# Patient Record
Sex: FEMALE | Race: ASIAN | NOT HISPANIC OR LATINO | URBAN - METROPOLITAN AREA
[De-identification: names, ages, dates, MRNs, and addresses within clinical notes are randomized per-mention and may not be internally consistent; named-entity substitution may affect disease eponyms.]

---

## 2019-02-27 ENCOUNTER — EMERGENCY (EMERGENCY)
Facility: HOSPITAL | Age: 39
LOS: 1 days | Discharge: HOME | End: 2019-02-27
Admitting: EMERGENCY MEDICINE
Payer: MEDICAID

## 2019-02-27 VITALS
TEMPERATURE: 98 F | SYSTOLIC BLOOD PRESSURE: 124 MMHG | OXYGEN SATURATION: 100 % | DIASTOLIC BLOOD PRESSURE: 88 MMHG | RESPIRATION RATE: 18 BRPM | HEART RATE: 110 BPM

## 2019-02-27 VITALS
HEART RATE: 90 BPM | RESPIRATION RATE: 16 BRPM | DIASTOLIC BLOOD PRESSURE: 78 MMHG | SYSTOLIC BLOOD PRESSURE: 130 MMHG | TEMPERATURE: 98 F | OXYGEN SATURATION: 100 %

## 2019-02-27 DIAGNOSIS — Z98.89 OTHER SPECIFIED POSTPROCEDURAL STATES: Chronic | ICD-10-CM

## 2019-02-27 DIAGNOSIS — F43.20 ADJUSTMENT DISORDER, UNSPECIFIED: ICD-10-CM

## 2019-02-27 PROCEDURE — 90792 PSYCH DIAG EVAL W/MED SRVCS: CPT | Mod: GC

## 2019-02-27 PROCEDURE — 99283 EMERGENCY DEPT VISIT LOW MDM: CPT

## 2019-02-27 NOTE — ED ADULT NURSE NOTE - CHIEF COMPLAINT QUOTE
Pt sent over from [facility] for depression and anxiety  Pt reports having suicidal thoughts x 2 weeks ago but denies any now. Pt states stopped taking her medication x 1 month ago.

## 2019-02-27 NOTE — ED ADULT TRIAGE NOTE - CHIEF COMPLAINT QUOTE
Pt sent over from [facility] for depression and anxiety  Pt reports having suicidal thoughts x 2 weeks ago but denies any now. Pt states stopped taking her medication x 1 month ago.

## 2019-02-27 NOTE — ED BEHAVIORAL HEALTH ASSESSMENT NOTE - SAFETY PLAN DETAILS
Patient instructed to call 911 or go to nearest ED with any thoughts of harming self or others. Patient identified distraction and seeking support from family as coping skills.

## 2019-02-27 NOTE — ED ADULT NURSE NOTE - OBJECTIVE STATEMENT
Received pt in  pt bought in from [facility] for psych eval,  pt verbalizing depression denies Si/Hi/AVh  presently safety & comfort measures maintained eval on going.

## 2019-02-27 NOTE — ED ADULT NURSE REASSESSMENT NOTE - NS ED NURSE REASSESS COMMENT FT1
Pt calm and cooperative at this time. Discharge paperwork provided by NP. Belongings returned. no acute distress.
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
## 2019-02-27 NOTE — ED BEHAVIORAL HEALTH ASSESSMENT NOTE - CASE SUMMARY
IN BRIEF, this is a 37 yo F sent in from her school after breaking down about feeling empty.  No clear SI or HI at present.  No psychotic symptoms. Patient is calm and cooperative.  No acute safety concerns. Patient does not want psychiatric admission.  As such, no legal grounds for involuntary admission.  Cleared psychiatrically for discharge.  Patient can follow-up at crisis center tomorrow to establish outpatient care.

## 2019-02-27 NOTE — ED PROVIDER NOTE - OBJECTIVE STATEMENT
37 y/o female presents to [facility] EMS from [facility] for psych eval for SI.  As per EMS, pt spoke to a counselor at school and expressed that she was suicidal, but unsure of a plan.  PT denies this, she states she told them she was suicidal 2 weeks ago, "but I don't feel that way now".  Pt is alert and oriented x 3, denies SI/HI/AH/VH.  Pt is calm and cooperative in  area and c/o a headache.
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
## 2019-02-27 NOTE — ED BEHAVIORAL HEALTH ASSESSMENT NOTE - OTHER PAST PSYCHIATRIC HISTORY (INCLUDE DETAILS REGARDING ONSET, COURSE OF ILLNESS, INPATIENT/OUTPATIENT TREATMENT)
PPHx of depression. Previously in therapy at [facility] (self d/c'ed several months ago). Was on Prozac 40mg qd prescribed by PMD, but self discontinued 1 month ago).

## 2019-02-27 NOTE — ED PROVIDER NOTE - CLINICAL SUMMARY MEDICAL DECISION MAKING FREE TEXT BOX
39 y/o female presents to [facility] EMS from [facility] for psych eval for SI. 39 y/o female presents to [facility] from [facility] for psych eval for SI.  Physical examination performed.  No clinical evidence of intoxication or any acute medical issues/problems requiring immediate interventions.  Psychiatric consultation requested and patient cleared for safe discharge.

## 2019-02-27 NOTE — ED BEHAVIORAL HEALTH ASSESSMENT NOTE - DESCRIPTION
Patient was calm and cooperative in the ED and did not exhibit any aggression. Pt did not require any prn medications or any physical restraints. 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

## 2019-02-27 NOTE — ED BEHAVIORAL HEALTH ASSESSMENT NOTE - SUICIDE PROTECTIVE FACTORS
Responsibility to family and others/Supportive social network or family/Future oriented/Engaged in work or school/Identifies reasons for living/Ability to cope with stress

## 2019-02-27 NOTE — ED BEHAVIORAL HEALTH ASSESSMENT NOTE - REFERRAL / APPOINTMENT DETAILS
normal...
Patient provided with information fro LT[facility], located within [facility]

## 2019-02-27 NOTE — ED BEHAVIORAL HEALTH NOTE - BEHAVIORAL HEALTH NOTE
Received call from Kylee Vaca ([phone]), director of counseling center at [facility], regarding this patient. Pt requested referral to ED because she reports concerns for her safety. She reports suicidal ideation with no plan or intent. Pt reports feeling "empty and stuck." States she felt like this when she attempted suicide at age 13, and she doesn't want to "get there" again. Pt abruptly stopped Prozac about 1 month ago.

## 2020-12-30 ENCOUNTER — APPOINTMENT (OUTPATIENT)
Dept: PULMONOLOGY | Facility: CLINIC | Age: 40
End: 2020-12-30
Payer: MEDICAID

## 2020-12-30 VITALS
SYSTOLIC BLOOD PRESSURE: 118 MMHG | HEART RATE: 72 BPM | HEIGHT: 61 IN | OXYGEN SATURATION: 99 % | WEIGHT: 230 LBS | BODY MASS INDEX: 43.43 KG/M2 | DIASTOLIC BLOOD PRESSURE: 72 MMHG

## 2020-12-30 DIAGNOSIS — Z82.5 FAMILY HISTORY OF ASTHMA AND OTHER CHRONIC LOWER RESPIRATORY DISEASES: ICD-10-CM

## 2020-12-30 DIAGNOSIS — Z83.3 FAMILY HISTORY OF DIABETES MELLITUS: ICD-10-CM

## 2020-12-30 DIAGNOSIS — Z86.39 PERSONAL HISTORY OF OTHER ENDOCRINE, NUTRITIONAL AND METABOLIC DISEASE: ICD-10-CM

## 2020-12-30 DIAGNOSIS — Z20.828 CONTACT WITH AND (SUSPECTED) EXPOSURE TO OTHER VIRAL COMMUNICABLE DISEASES: ICD-10-CM

## 2020-12-30 DIAGNOSIS — Z82.49 FAMILY HISTORY OF ISCHEMIC HEART DISEASE AND OTHER DISEASES OF THE CIRCULATORY SYSTEM: ICD-10-CM

## 2020-12-30 PROCEDURE — 99203 OFFICE O/P NEW LOW 30 MIN: CPT

## 2020-12-30 PROCEDURE — 99072 ADDL SUPL MATRL&STAF TM PHE: CPT

## 2020-12-30 RX ORDER — FLUOXETINE HYDROCHLORIDE 40 MG/1
40 CAPSULE ORAL
Qty: 30 | Refills: 0 | Status: DISCONTINUED | COMMUNITY
Start: 2020-10-02

## 2020-12-30 RX ORDER — ATORVASTATIN CALCIUM 20 MG/1
20 TABLET, FILM COATED ORAL
Qty: 30 | Refills: 0 | Status: ACTIVE | COMMUNITY
Start: 2020-12-16

## 2020-12-30 RX ORDER — FLUTICASONE PROPIONATE 50 UG/1
50 SPRAY, METERED NASAL
Qty: 16 | Refills: 0 | Status: DISCONTINUED | COMMUNITY
Start: 2020-09-23

## 2020-12-30 RX ORDER — FLUOXETINE HYDROCHLORIDE 20 MG/1
20 CAPSULE ORAL
Qty: 30 | Refills: 0 | Status: DISCONTINUED | COMMUNITY
Start: 2020-09-17

## 2020-12-30 NOTE — REASON FOR VISIT
[Consultation] : a consultation [Pre-op Risk Stratification] : pre-op risk stratification [Obesity] : obesity [Family Member] : family member

## 2021-01-10 ENCOUNTER — TRANSCRIPTION ENCOUNTER (OUTPATIENT)
Age: 41
End: 2021-01-10

## 2021-01-24 ENCOUNTER — OUTPATIENT (OUTPATIENT)
Dept: OUTPATIENT SERVICES | Facility: HOSPITAL | Age: 41
LOS: 1 days | End: 2021-01-24
Payer: COMMERCIAL

## 2021-01-24 DIAGNOSIS — G47.33 OBSTRUCTIVE SLEEP APNEA (ADULT) (PEDIATRIC): ICD-10-CM

## 2021-01-24 DIAGNOSIS — Z98.89 OTHER SPECIFIED POSTPROCEDURAL STATES: Chronic | ICD-10-CM

## 2021-01-24 PROCEDURE — 95806 SLEEP STUDY UNATT&RESP EFFT: CPT

## 2021-01-24 PROCEDURE — G0399: CPT

## 2021-01-24 PROCEDURE — 95806 SLEEP STUDY UNATT&RESP EFFT: CPT | Mod: 26

## 2021-01-28 ENCOUNTER — APPOINTMENT (OUTPATIENT)
Dept: PULMONOLOGY | Facility: CLINIC | Age: 41
End: 2021-01-28

## 2021-02-06 ENCOUNTER — LABORATORY RESULT (OUTPATIENT)
Age: 41
End: 2021-02-06

## 2021-02-06 ENCOUNTER — APPOINTMENT (OUTPATIENT)
Dept: DISASTER EMERGENCY | Facility: CLINIC | Age: 41
End: 2021-02-06

## 2021-02-10 ENCOUNTER — APPOINTMENT (OUTPATIENT)
Dept: PULMONOLOGY | Facility: CLINIC | Age: 41
End: 2021-02-10
Payer: MEDICAID

## 2021-02-10 VITALS — TEMPERATURE: 98.2 F | WEIGHT: 228 LBS | BODY MASS INDEX: 45.36 KG/M2 | HEIGHT: 59.5 IN

## 2021-02-10 DIAGNOSIS — Z01.811 ENCOUNTER FOR PREPROCEDURAL RESPIRATORY EXAMINATION: ICD-10-CM

## 2021-02-10 PROCEDURE — 85018 HEMOGLOBIN: CPT | Mod: QW

## 2021-02-10 PROCEDURE — 99213 OFFICE O/P EST LOW 20 MIN: CPT | Mod: 25

## 2021-02-10 PROCEDURE — 94010 BREATHING CAPACITY TEST: CPT

## 2021-02-10 PROCEDURE — 99072 ADDL SUPL MATRL&STAF TM PHE: CPT

## 2021-02-10 PROCEDURE — 94727 GAS DIL/WSHOT DETER LNG VOL: CPT

## 2021-02-10 PROCEDURE — 94729 DIFFUSING CAPACITY: CPT

## 2021-02-10 NOTE — CONSULT LETTER
[Dear  ___] : Dear  [unfilled], [Consult Letter:] : I had the pleasure of evaluating your patient, [unfilled]. [Please see my note below.] : Please see my note below. [Consult Closing:] : Thank you very much for allowing me to participate in the care of this patient.  If you have any questions, please do not hesitate to contact me. [Sincerely,] : Sincerely, [FreeTextEntry3] : Juan C Reinoso MD\par

## 2021-02-10 NOTE — CONSULT LETTER
[Dear  ___] : Dear  [unfilled], [Consult Letter:] : I had the pleasure of evaluating your patient, [unfilled]. [Please see my note below.] : Please see my note below. [Sincerely,] : Sincerely, [DrScott  ___] : Dr. VIEYRA [FreeTextEntry3] : Juan C Reinoso MD\par

## 2021-02-10 NOTE — REVIEW OF SYSTEMS
[Fatigue] : fatigue [SOB on Exertion] : sob on exertion [Negative] : Endocrine [TextBox_151] : snoring

## 2021-02-11 ENCOUNTER — APPOINTMENT (OUTPATIENT)
Dept: PULMONOLOGY | Facility: CLINIC | Age: 41
End: 2021-02-11
Payer: MEDICAID

## 2021-02-11 VITALS
BODY MASS INDEX: 45.36 KG/M2 | SYSTOLIC BLOOD PRESSURE: 132 MMHG | RESPIRATION RATE: 14 BRPM | DIASTOLIC BLOOD PRESSURE: 82 MMHG | HEIGHT: 59.5 IN | WEIGHT: 228 LBS | HEART RATE: 77 BPM | OXYGEN SATURATION: 97 %

## 2021-02-11 DIAGNOSIS — E66.01 MORBID (SEVERE) OBESITY DUE TO EXCESS CALORIES: ICD-10-CM

## 2021-02-11 DIAGNOSIS — R06.00 DYSPNEA, UNSPECIFIED: ICD-10-CM

## 2021-02-11 DIAGNOSIS — G47.30 SLEEP APNEA, UNSPECIFIED: ICD-10-CM

## 2021-02-11 PROCEDURE — 99072 ADDL SUPL MATRL&STAF TM PHE: CPT

## 2021-02-11 PROCEDURE — 99213 OFFICE O/P EST LOW 20 MIN: CPT

## 2021-02-11 RX ORDER — CELECOXIB 200 MG/1
200 CAPSULE ORAL
Qty: 60 | Refills: 0 | Status: DISCONTINUED | COMMUNITY
Start: 2020-11-05 | End: 2021-02-11

## 2021-02-11 NOTE — ASSESSMENT
[FreeTextEntry1] : Obesity \par Mild ADOLFO - no treatment required\par No significant pulmonary or sleep medicine related contraindication to bariatric surgery under general anesthesia\par

## 2021-02-11 NOTE — PHYSICAL EXAM
[No Acute Distress] : no acute distress [Normal Oropharynx] : normal oropharynx [Normal Appearance] : normal appearance [No Neck Mass] : no neck mass [Normal Rate/Rhythm] : normal rate/rhythm [Normal S1, S2] : normal s1, s2 [No Murmurs] : no murmurs [No Resp Distress] : no resp distress [Clear to Auscultation Bilaterally] : clear to auscultation bilaterally [No Abnormalities] : no abnormalities [Benign] : benign [Normal Gait] : normal gait [No Clubbing] : no clubbing [No Cyanosis] : no cyanosis [No Edema] : no edema [FROM] : FROM [Normal Color/ Pigmentation] : normal color/ pigmentation [No Focal Deficits] : no focal deficits [Oriented x3] : oriented x3 [Normal Affect] : normal affect [TextBox_2] : Obese

## 2021-03-19 ENCOUNTER — RESULT REVIEW (OUTPATIENT)
Age: 41
End: 2021-03-19

## 2021-09-16 NOTE — ED BEHAVIORAL HEALTH ASSESSMENT NOTE - CURRENT INTENT
- Discussed normal genetic testing and reassuring anatomy ultrasounds  - Flu shot given today  - Discussed COVID vaccination, patient to schedule on portal.  - Plan for 3rd trimester labs at next OB visit  - Answered patients questions regarding working during pregnancy, concerns about flu shot, issues with sleeping, etc.   None known

## 2022-05-31 NOTE — ED BEHAVIORAL HEALTH ASSESSMENT NOTE - NS ED BHA PLAN TR BH CONTACTED FT
LOV 5/24/2022, [facility] pharmacy   Not currently in outpatient treatment. No previous psychiatrist.

## 2022-12-09 NOTE — ED PROVIDER NOTE - ENMT, MLM
Airway patent, Nasal mucosa clear. Mouth with normal mucosa. Throat has no vesicles, no oropharyngeal exudates and uvula is midline.
Christi Rojas

## 2023-01-25 ENCOUNTER — NON-APPOINTMENT (OUTPATIENT)
Age: 43
End: 2023-01-25

## 2023-11-07 NOTE — ED BEHAVIORAL HEALTH ASSESSMENT NOTE - NS ED BHA PLAN
solution Place 1 drop into both eyes as needed for Dry Eyes 1 Bottle 5    blood glucose test strips (ONE TOUCH ULTRA TEST) strip use 1 TEST STRIP to TEST BLOOD SUGAR twice a day 100 strip 3    SOFT TOUCH LANCETS MISC 1 Device by Does not apply route 2 times daily 200 each 3     No current facility-administered medications for this visit. Allergies:  Latex, Aleve [naproxen], Amoxicillin, Aspirin, Dye [iodides], Influenza vaccines, and Reglan [metoclopramide]    Social History:   Social History     Tobacco Use   Smoking Status Never   Smokeless Tobacco Never   Tobacco Comments    Never smoked. Sabiha Shipman Rcp, 2016. Social History     Substance and Sexual Activity   Alcohol Use No    Alcohol/week: 0.0 standard drinks of alcohol     Social History     Substance and Sexual Activity   Drug Use No       Family History:  Family History   Problem Relation Age of Onset    Heart Disease Mother     Heart Attack Mother     Arthritis Mother     Other Maternal Grandmother         (gout)    Allergies Daughter     Allergies Daughter     Other Brother         ( at age 21 secondary to auto accident). Cataracts Neg Hx     Diabetes Neg Hx     Glaucoma Neg Hx        REVIEW OF SYSTEMS:  Constitutional: Denies any fever, chills. Musculoskeletal: Positive for status post carpal release. PHYSICAL EXAM:  [unfilled]  General appearance:  Alert and oriented x 3. No apparent distress, appears stated age, calm and cooperative. Musculoskeletal: Limited range of motion of fingers secondary to remaining in Ace wrap from postop skin incisions well proximal healing.       DATA:  CBC:   Lab Results   Component Value Date/Time    WBC 7.0 2023 11:33 AM    HGB 14.0 2023 11:33 AM     2023 11:33 AM     BMP:    Lab Results   Component Value Date/Time     2023 11:33 AM    K 4.0 2023 11:33 AM    CL 99 2023 11:33 AM    CO2 30 2023 11:33 AM    BUN 22 2023 11:33 AM Treat and Release

## 2024-05-14 NOTE — ED BEHAVIORAL HEALTH ASSESSMENT NOTE - ACCOMPANIED BY
[FreeTextEntry1] : 1. ILD: ?Autoimmune from collagen vascular diseae. CT shows predominant reticular abnormalities, but some GGOs as well. I will discuss further with Dr. Chairez appropriate treatment. RTC in 3 months with repeat PFTs. and CT chest Self

## 2024-08-21 ENCOUNTER — NON-APPOINTMENT (OUTPATIENT)
Age: 44
End: 2024-08-21

## 2025-02-19 ENCOUNTER — APPOINTMENT (OUTPATIENT)
Age: 45
End: 2025-02-19
Payer: COMMERCIAL

## 2025-02-19 VITALS
WEIGHT: 195 LBS | SYSTOLIC BLOOD PRESSURE: 110 MMHG | HEIGHT: 62 IN | BODY MASS INDEX: 35.88 KG/M2 | DIASTOLIC BLOOD PRESSURE: 70 MMHG

## 2025-02-19 DIAGNOSIS — Z80.3 FAMILY HISTORY OF MALIGNANT NEOPLASM OF BREAST: ICD-10-CM

## 2025-02-19 DIAGNOSIS — Z12.39 ENCOUNTER FOR OTHER SCREENING FOR MALIGNANT NEOPLASM OF BREAST: ICD-10-CM

## 2025-02-19 DIAGNOSIS — F32.A ANXIETY DISORDER, UNSPECIFIED: ICD-10-CM

## 2025-02-19 DIAGNOSIS — F41.9 ANXIETY DISORDER, UNSPECIFIED: ICD-10-CM

## 2025-02-19 DIAGNOSIS — Z01.419 ENCOUNTER FOR GYNECOLOGICAL EXAMINATION (GENERAL) (ROUTINE) W/OUT ABNORMAL FINDINGS: ICD-10-CM

## 2025-02-19 DIAGNOSIS — N60.19 DIFFUSE CYSTIC MASTOPATHY OF UNSPECIFIED BREAST: ICD-10-CM

## 2025-02-19 DIAGNOSIS — N95.1 MENOPAUSAL AND FEMALE CLIMACTERIC STATES: ICD-10-CM

## 2025-02-19 PROCEDURE — 99213 OFFICE O/P EST LOW 20 MIN: CPT | Mod: 25

## 2025-02-19 PROCEDURE — 99386 PREV VISIT NEW AGE 40-64: CPT | Mod: 25

## 2025-02-19 RX ORDER — BUSPIRONE HCL 5 MG
TABLET ORAL
Refills: 0 | Status: ACTIVE | COMMUNITY

## 2025-02-19 RX ORDER — LAMOTRIGINE 50 MG/1
50 TABLET ORAL
Refills: 0 | Status: ACTIVE | COMMUNITY

## 2025-02-21 LAB
DHEA-S SERPL-MCNC: 252 UG/DL
ESTRADIOL SERPL-MCNC: 343 PG/ML
FSH SERPL-MCNC: 6.9 IU/L
LH SERPL-ACNC: 18 IU/L
T4 FREE SERPL-MCNC: 1 NG/DL
TESTOST SERPL-MCNC: 42.6 NG/DL
TSH SERPL-ACNC: 1.86 UIU/ML

## 2025-02-24 LAB — DHEA SERPL-MCNC: 477 NG/DL
